# Patient Record
Sex: FEMALE | URBAN - METROPOLITAN AREA
[De-identification: names, ages, dates, MRNs, and addresses within clinical notes are randomized per-mention and may not be internally consistent; named-entity substitution may affect disease eponyms.]

---

## 2021-03-10 ENCOUNTER — OFFICE VISIT (OUTPATIENT)
Dept: URBAN - METROPOLITAN AREA CLINIC 14 | Facility: CLINIC | Age: 66
End: 2021-03-10
Payer: MEDICARE

## 2021-03-10 DIAGNOSIS — H43.811 VITREOUS DEGENERATION, RIGHT EYE: ICD-10-CM

## 2021-03-10 DIAGNOSIS — H10.45 OTHER CHRONIC ALLERGIC CONJUNCTIVITIS: Primary | ICD-10-CM

## 2021-03-10 DIAGNOSIS — H25.813 COMBINED FORMS OF AGE-RELATED CATARACT, BILATERAL: ICD-10-CM

## 2021-03-10 PROCEDURE — 92004 COMPRE OPH EXAM NEW PT 1/>: CPT | Performed by: OPHTHALMOLOGY

## 2021-03-10 RX ORDER — OMEGA-3/DHA/EPA/FISH OIL 1000 MG
CAPSULE ORAL
Qty: 0 | Refills: 0 | Status: ACTIVE
Start: 2021-03-10

## 2021-03-10 ASSESSMENT — INTRAOCULAR PRESSURE
OS: 16
OD: 16

## 2021-03-10 NOTE — IMPRESSION/PLAN
Impression: Vitreous degeneration, right eye: H43.811. Plan: Discussed, in detail, and Reassured pt about diagnosis. Discussed treatment options with patient, though no treatment is required/recommend at this time. Discussed signs and symptoms of PVD/floaters. Discussed signs and symptoms of retinal detachment. Will continue to observe for any changes.

## 2021-03-10 NOTE — IMPRESSION/PLAN
Impression: Other chronic allergic conjunctivitis: H10.45. Plan: Discussed diagnosis with patient. Instructed patient to get OTC Pataday. Advised patient to get AFT OU PRN. Patient understands and agrees with plan.